# Patient Record
Sex: FEMALE | Race: WHITE
[De-identification: names, ages, dates, MRNs, and addresses within clinical notes are randomized per-mention and may not be internally consistent; named-entity substitution may affect disease eponyms.]

---

## 2018-09-10 NOTE — HP
PREOPERATIVE HISTORY AND PHYSICAL:

 

DATE OF ADMISSION/SURGERY:  09/18/18 [facility]

 

DATE OF OFFICE VISIT/ENCOUNTER:  09/06/18

 

ATTENDING SURGEON:  Zuri Marcus MD * (DICTATED BY MAULIK WHITAKER)

 

CARDIOLOGIST:  Dr. Jiménez.

 

PROCEDURE:  Right elbow lateral release.

 

CHIEF COMPLAINT:  Right elbow pain.

 

HISTORY OF PRESENT ILLNESS:  This is a 47-year-old female who has had ongoing 
problems with lateral epicondylitis of her right elbow since November 2017.  It 
all started when she smacked her elbow on a barricade at work.  She is employed 
at [employer] in [place].  Since then, she has had somewhat persistent pain and has 
failed conservative treatment including physical therapy and 2 cortisone 
injections.  She is interested in pursuing a more permanent solution to this 
problem and has consented to proceed with surgery in the form of a right elbow 
lateral release. She is followed by Dr. Jiménez, cardiologist, and we will 
receive clearance from him prior to proceeding with surgery.

 

PAST MEDICAL HISTORY:

1.  History of resting sinus bradycardia.

2.  High cholesterol.

3.  Anxiety/depression.

4.  Seasonal allergies.

5.  History of hepatitis C.

6.  History of alcohol and drug addiction.

7.  History of PACs, PVCs, and a brief run of SVT as shown on a Holter monitor 
less than a year ago.

 

PAST SURGICAL HISTORY:

1.  Cholecystectomy.

2.  De Quervain's release, left wrist.

3.  Tonsillectomy.

 

MEDICATIONS:

1.  Allegra Allergy 1 tab daily p.r.n.

2.  Bupropion HCl  mg daily.

3.  Calcium carbonate/vitamin D 600 mg/800 international units daily.

4.  Calcium citrate plus D3 daily.

5.  Flonase Allergy Relief 1 spray each nostril twice daily p.r.n.

6.  Iron 28 mg daily.

7.  L-Tyrosine 500 mg daily.

8.  Lorazepam 0.5 mg 1 tab q.h.s.

9.  Melatonin 3 mg every night.

10.  Naproxen 500 mg twice a day p.r.n. pain.

11.  Paroxetine HCl 30 mg daily.

12.  Simvastatin 20 mg daily.

 

ALLERGIES:  No known drug allergies.  Allergic to SHELLFISH-DERIVED PRODUCTS.

 

FAMILY MEDICAL HISTORY:  Noncontributory.

 

SOCIAL HISTORY:  The patient is employed at [employer] in [place] as a .  She 
quit smoking in 2007, prior to that she smoked a pack per day for 25 years.  
She denies recreational drug use since 2004 and has not drank alcohol since 
2004.

 

REVIEW OF SYSTEMS:  Negative for general, cephalic, cardiovascular, respiratory
, GI, , other musculoskeletal, integumentary, endocrine, neurologic, and 
hematologic symptoms.  Infectious disease is positive for history of hepatitis 
C. Negative for MRSA and HIV.  No known anesthesia problems in the past.

 

                               PHYSICAL EXAMINATION

 

GENERAL:  Well-developed, well-nourished 47-year-old female in no acute 
distress.

 

VITAL SIGNS:  Height 5 feet 4 inches, weight 135 pounds.  Blood pressure 103/70
, respiratory rate 16, pulse rate 68.

 

HEENT:  Normocephalic, atraumatic.  Pupils are equal, round, and reactive to 
light and accommodation.  Extraocular movements are intact.

 

NECK:  Supple.  No palpable lymph nodes.  Throat is clear.

 

PULMONARY:  Lungs are clear to auscultation bilaterally.  No wheezes, rales, or 
rhonchi.

 

CARDIOVASCULAR:  Regular rate and rhythm.  S1 and S2.  No murmurs, rubs, or 
gallops.  No edema.

 

ABDOMEN:  Positive bowel sounds.  Soft, nontender.

 

MUSCULOSKELETAL:  On exam of her right elbow, there is no visible swelling and 
no ecchymosis.  There is tenderness to palpation at the lateral epicondyle and 
down into the forearm musculature.  She has full range of motion of the elbow, 
but increased pain with full extension, especially while squeezing my hand.  
She also has pain with wrist extension and forearm supination against 
resistance. Neurovascular function is intact.

 

NEUROLOGIC:  Alert and oriented x3.  Cranial nerves II through XII are intact. 
Sensation is intact to light touch.

 

 IMAGING STUDIES:  X-rays, AP, lateral, and oblique, of the right elbow are 
unremarkable for any acute findings.  No osseous abnormalities noted.

 

IMPRESSION:  Right elbow lateral epicondylitis.

 

PLAN/RECOMMENDATIONS:  The patient is scheduled to undergo a right elbow 
lateral release with Dr. Marcus on 

09/18/18.  She will return to the office 10 days postop for followup and suture 
removal.  A prescription for naproxen was e-scribed to the patient's pharmacy 
for postoperative pain management.

 

 ____________________________________ MAULIK WHITAKER

 

[id number]/CPS #: [id number]

MARY ANNE

## 2018-09-18 ENCOUNTER — HOSPITAL ENCOUNTER (OUTPATIENT)
Dept: HOSPITAL 25 - OREAST | Age: 48
Discharge: HOME | End: 2018-09-18
Attending: ORTHOPAEDIC SURGERY
Payer: COMMERCIAL

## 2018-09-18 VITALS — SYSTOLIC BLOOD PRESSURE: 102 MMHG | DIASTOLIC BLOOD PRESSURE: 65 MMHG

## 2018-09-18 DIAGNOSIS — M77.11: Primary | ICD-10-CM

## 2018-09-18 DIAGNOSIS — Z86.19: ICD-10-CM

## 2018-09-18 DIAGNOSIS — F41.8: ICD-10-CM

## 2018-09-18 DIAGNOSIS — J30.2: ICD-10-CM

## 2018-09-18 DIAGNOSIS — Z87.891: ICD-10-CM

## 2018-09-18 DIAGNOSIS — R01.1: ICD-10-CM

## 2018-09-18 DIAGNOSIS — R00.1: ICD-10-CM

## 2018-09-18 DIAGNOSIS — I49.49: ICD-10-CM

## 2018-09-18 DIAGNOSIS — F19.11: ICD-10-CM

## 2018-09-18 PROCEDURE — 88304 TISSUE EXAM BY PATHOLOGIST: CPT

## 2018-09-18 PROCEDURE — 81025 URINE PREGNANCY TEST: CPT

## 2018-09-19 NOTE — OP
DATE OF OPERATION:  09/18/18 [facility]

 

DATE OF BIRTH:  [birth year]

 

SURGEON:  Zuri Marcus MD

 

ASSISTANT:  MAULIK Smyth

 

ANESTHESIA:  IV regional.

 

PRE-OP DIAGNOSIS:  Right elbow lateral epicondylitis.

 

POST-OP DIAGNOSIS:  Right elbow lateral epicondylitis.

 

OPERATIVE PROCEDURE:  Right elbow lateral release.

 

INDICATIONS FOR PROCEDURE:  Brandi is a 47-year-old female who has right lateral 
epicondylitis, which has failed conservative treatment including physical 
therapy and cortisone injection.  She presents for right elbow lateral release.

 

ESTIMATED BLOOD LOSS:  Zero.

 

TOURNIQUET TIME:  About 25 minutes.

 

DESCRIPTION OF PROCEDURE:  The patient was brought to the operating room, was 
given an IV regional anesthetic with a tourniquet around the right upper arms.  
Skin of her right upper extremity was prepped and draped in the usual sterile 
fashion.  A lateral longitudinal incision was made centered at the lateral 
epicondyle. We dissected sharply through the subcutaneous tissue down to the 
extensor origin.  The extensor origin was subperiosteally elevated off the 
lateral epicondyle, which had very significant bony prominence and significant 
underlying degenerated tissue of the ECRB.  The degenerated tissue was 
thoroughly debrided and then the prominence of the lateral epicondyle was 
removed with a rongeur down to bleeding surface of bone.  The extensor origin 
was then repaired in lengthened fashion with #1 Vicryl suture.  The wound was 
copiously irrigated with saline.  The subcutaneous tissue was closed with 2-0 
Polysorb suture and the skin with skin staples.  The wound was dressed with 
Xeroform, 4x4, Webril, and an Ace wrap.  The patient tolerated the procedure 
well and was brought to the recovery room in good condition.

 

 [id number]/CPS #: [id number]

[facility]